# Patient Record
Sex: MALE | Race: WHITE | NOT HISPANIC OR LATINO | URBAN - METROPOLITAN AREA
[De-identification: names, ages, dates, MRNs, and addresses within clinical notes are randomized per-mention and may not be internally consistent; named-entity substitution may affect disease eponyms.]

---

## 2018-05-29 ENCOUNTER — OFFICE VISIT - HEALTHEAST (OUTPATIENT)
Dept: UROLOGY | Facility: CLINIC | Age: 58
End: 2018-05-29

## 2018-05-29 DIAGNOSIS — N23 RENAL COLIC: ICD-10-CM

## 2018-05-29 DIAGNOSIS — N20.9 URINARY TRACT STONES: ICD-10-CM

## 2018-05-29 DIAGNOSIS — N20.1 CALCULUS OF URETER: ICD-10-CM

## 2018-05-29 LAB
ALBUMIN UR-MCNC: NEGATIVE MG/DL
APPEARANCE UR: CLEAR
BILIRUB UR QL STRIP: NEGATIVE
COLOR UR AUTO: YELLOW
GLUCOSE UR STRIP-MCNC: ABNORMAL MG/DL
HGB UR QL STRIP: NEGATIVE
KETONES UR STRIP-MCNC: NEGATIVE MG/DL
LEUKOCYTE ESTERASE UR QL STRIP: NEGATIVE
NITRATE UR QL: NEGATIVE
PH UR STRIP: 5 [PH] (ref 5–8)
SP GR UR STRIP: 1.02 (ref 1–1.03)
UROBILINOGEN UR STRIP-ACNC: ABNORMAL

## 2018-05-29 RX ORDER — CHLORTHALIDONE 25 MG/1
TABLET ORAL
Status: SHIPPED | COMMUNITY
Start: 2017-11-09

## 2018-05-29 RX ORDER — AMLODIPINE BESYLATE 10 MG/1
TABLET ORAL
Status: SHIPPED | COMMUNITY
Start: 2017-11-09

## 2018-05-29 RX ORDER — MINOCYCLINE HYDROCHLORIDE 100 MG/1
CAPSULE ORAL
Status: SHIPPED | COMMUNITY
Start: 2018-04-06

## 2018-05-29 RX ORDER — LISINOPRIL 40 MG/1
TABLET ORAL
Status: SHIPPED | COMMUNITY
Start: 2017-11-09

## 2018-05-29 RX ORDER — SIMVASTATIN 10 MG
TABLET ORAL
Status: SHIPPED | COMMUNITY
Start: 2017-06-12

## 2018-06-11 ENCOUNTER — AMBULATORY - HEALTHEAST (OUTPATIENT)
Dept: LAB | Facility: CLINIC | Age: 58
End: 2018-06-11

## 2018-06-11 ENCOUNTER — AMBULATORY - HEALTHEAST (OUTPATIENT)
Dept: UROLOGY | Facility: CLINIC | Age: 58
End: 2018-06-11

## 2018-06-11 DIAGNOSIS — N20.1 CALCULUS OF URETER: ICD-10-CM

## 2018-06-13 LAB
1ST CONSTITUENT:: NORMAL
SOURCE: NORMAL

## 2018-06-22 ENCOUNTER — OFFICE VISIT - HEALTHEAST (OUTPATIENT)
Dept: UROLOGY | Facility: CLINIC | Age: 58
End: 2018-06-22

## 2018-06-22 DIAGNOSIS — N20.9 URINARY CALCULUS: ICD-10-CM

## 2018-06-22 DIAGNOSIS — N20.0 NEPHROLITHIASIS, URIC ACID: ICD-10-CM

## 2018-06-22 DIAGNOSIS — N20.0 CALCULUS OF KIDNEY: ICD-10-CM

## 2021-06-16 PROBLEM — N20.1 URETERAL STONE: Status: ACTIVE | Noted: 2018-05-28

## 2021-06-18 NOTE — PROGRESS NOTES
Assessment/Plan:        Diagnoses and all orders for this visit:    Urinary tract stones  -     Urinalysis Macroscopic  -     Symptom Control While Passing a Stone Education  -     CT Abdomen Pelvis Without Oral Without IV Contrast; Future; Expected date: 6/12/18  -     ondansetron (ZOFRAN-ODT) 4 MG disintegrating tablet; Take 1 tablet (4 mg total) by mouth every 6 (six) hours as needed for nausea.  Dispense: 10 tablet; Refill: 1  -     oxyCODONE (ROXICODONE) 5 MG immediate release tablet; Take 1-2 tablets (5-10 mg total) by mouth every 4 (four) hours as needed for pain.  Dispense: 24 tablet; Refill: 0  -     Patient Stated Goal: Pass my stone    Renal colic  -     Symptom Control While Passing a Stone Education  -     CT Abdomen Pelvis Without Oral Without IV Contrast; Future; Expected date: 6/12/18  -     ondansetron (ZOFRAN-ODT) 4 MG disintegrating tablet; Take 1 tablet (4 mg total) by mouth every 6 (six) hours as needed for nausea.  Dispense: 10 tablet; Refill: 1  -     oxyCODONE (ROXICODONE) 5 MG immediate release tablet; Take 1-2 tablets (5-10 mg total) by mouth every 4 (four) hours as needed for pain.  Dispense: 24 tablet; Refill: 0  -     Patient Stated Goal: Pass my stone    Calculus of ureter  -     Symptom Control While Passing a Stone Education  -     CT Abdomen Pelvis Without Oral Without IV Contrast; Future; Expected date: 6/12/18  -     ondansetron (ZOFRAN-ODT) 4 MG disintegrating tablet; Take 1 tablet (4 mg total) by mouth every 6 (six) hours as needed for nausea.  Dispense: 10 tablet; Refill: 1  -     oxyCODONE (ROXICODONE) 5 MG immediate release tablet; Take 1-2 tablets (5-10 mg total) by mouth every 4 (four) hours as needed for pain.  Dispense: 24 tablet; Refill: 0  -     Patient Stated Goal: Pass my stone    Other orders  -     amLODIPine (NORVASC) 10 MG tablet; TAKE ONE TABLET BY MOUTH DAILY.  -     aspirin-calcium carbonate 81 mg-300 mg calcium(777 mg) Tab; Take 81 mg by mouth.  -      chlorthalidone (HYGROTEN) 25 MG tablet; TAKE 1 TABLET BY MOUTH DAILY  -     lisinopril (PRINIVIL,ZESTRIL) 40 MG tablet; TAKE 1 TABLET BY MOUTH DAILY  -     minocycline (MINOCIN,DYNACIN) 100 MG capsule; TAKE 1 CAPSULE BY MOUTH TWICE DAILY  -     multivitamin (ONE A DAY) per tablet; Take by mouth.  -     simvastatin (ZOCOR) 10 MG tablet; TAKE 1 TABLET BY MOUTH DAILY      Stone Management Plan  [facility] 5/29/2018   Urinary Tract Infection No suspicion of infection   Renal Colic Well controlled symptoms   Renal Failure No suspicion of renal failure   Current CT date 5/28/2018   Right sided stones? No   R Stone Event No current event   Left sided stones? Yes   L Number of ureteral stones 1   L GSD of ureteral stones 3   L Location of ureteral stone Distal   L Number of kidney stones  5   L GSD of kidney stones 2 - 4   L Hydronephrosis Mild   L Stone Event New event   Diagnosis date 5/28/2018   Initial location of primary symptomatic stone Distal   Initial GSD of primary symptomatic stone 3   L MET Status Initiation   L Current Plan MET   MET 2 week F/U             Subjective:      HPI  Mr. Nilesh Morrow is a 58 y.o.  male presenting to the [facility] [place] with history of onset of left flank pain on 5/27/2018.  Pain was controlled.  In the course of this his urine showed specific gravity 1.15 pH 6 moderate blood 3 5 RBCs 0-5 WBCs potassium 3.5 sodium 136 calcium 10.5 creatinine 1.61 white blood count 9100 hemoglobin 16.5.    Personal review of CT scan obtained 5/27/2018 demonstrated a 2.5 mm left distal ureteral stone 2 cm from the UVJ.  There were no stones on the right.  In the left kidney there is a cystic mass in the upper pole with milk of calcium-like substance present.  This lesion was poorly defined.  In the lower pole medially there was a cystic lesion containing an aggregate of stones on axial 3 mm x 19 mm and on coronal it flattens out measures about 17 x 9 mm irregular  in character with stones probably the largest being 3 mm.  Both these cystic lesions could be a calyceal diverticulum t.  THere were 2 other punctate stones in the left kidney lower pole outside of the cystic lesions.  1.5 cm cyst noted right lower pole lateral aspect.    Patient comes at this time asymptomatic without abdominal or flank pain or voiding symptoms of urgency or frequency.    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  Alternative would be a retrograde .    Patient has available to him for pain relief acetaminophen Dramamine ibuprofen a small amount of Percocet.  We did give him 24 tablets of oxycodone to take as long as he is using the 1000 mg acetaminophen every 6 hour regimen.       ROS   Review of Systems  A 12 point comprehensive review of systems is negative except for HPI    Past Medical History:   Diagnosis Date     Diabetes mellitus      Hypertension      Kidney stone        Past Surgical History:   Procedure Laterality Date     KNEE SURGERY Bilateral      NM REMOVAL OF SPERM DUCT(S)      Description: Surgery Of Male Genitalia Vasectomy;  Recorded: 10/10/2008;       Current Outpatient Prescriptions   Medication Sig Dispense Refill     acetaminophen (TYLENOL) 500 MG tablet Take 2 tablets (1,000 mg total) by mouth 4 (four) times a day for 7 days. 56 tablet 0     amLODIPine (NORVASC) 10 MG tablet TAKE ONE TABLET BY MOUTH DAILY.       aspirin-calcium carbonate 81 mg-300 mg calcium(777 mg) Tab Take 81 mg by mouth.       chlorthalidone (HYGROTEN) 25 MG tablet TAKE 1 TABLET BY MOUTH DAILY       dimenhyDRINATE (DRAMAMINE) 50 MG tablet Take 1 tablet (50 mg total) by  mouth 4 (four) times a day as needed. 28 tablet 0     ibuprofen (ADVIL,MOTRIN) 200 MG tablet Take 2 tablets (400 mg total) by mouth 4 (four) times a day for 7 days. 56 tablet 0     lisinopril (PRINIVIL,ZESTRIL) 40 MG tablet TAKE 1 TABLET BY MOUTH DAILY       metFORMIN (GLUCOPHAGE) 500 MG tablet Take 1 tablet (500 mg total) by mouth 2 (two) times a day with meals. 60 tablet 0     minocycline (MINOCIN,DYNACIN) 100 MG capsule TAKE 1 CAPSULE BY MOUTH TWICE DAILY       multivitamin (ONE A DAY) per tablet Take by mouth.       oxyCODONE-acetaminophen (PERCOCET) 5-325 mg per tablet Take 1 tablet by mouth every 4 (four) hours as needed for pain. 15 tablet 0     simvastatin (ZOCOR) 10 MG tablet TAKE 1 TABLET BY MOUTH DAILY       ondansetron (ZOFRAN-ODT) 4 MG disintegrating tablet Take 1 tablet (4 mg total) by mouth every 6 (six) hours as needed for nausea. 10 tablet 1     oxyCODONE (ROXICODONE) 5 MG immediate release tablet Take 1-2 tablets (5-10 mg total) by mouth every 4 (four) hours as needed for pain. 24 tablet 0     No current facility-administered medications for this visit.        Allergies   Allergen Reactions     Penicillins        Social History     Social History     Marital status:      Spouse name: N/A     Number of children: N/A     Years of education: N/A     Occupational History      of medical devices      Social History Main Topics     Smoking status: Never Smoker     Smokeless tobacco: Never Used     Alcohol use Yes      Comment: occas     Drug use: Not on file     Sexual activity: Not on file     Other Topics Concern     Not on file     Social History Narrative       Family History   Problem Relation Age of Onset     Diabetes Mother      Diabetes Father      Gout Father      Heart disease Father      mi and a-fib     Urolithiasis Brother      Clotting disorder Neg Hx      Cancer Neg Hx        Objective:      Physical Exam  Vitals:    05/29/18 1109   BP: 114/73   Pulse: 74   Temp: 98.6  F (37   C)     General - well developed, well nourished, appropriate for age. Appears no distress at this time   Heart - regular rate and rhythm, no murmur  Respiratory - normal effort, clear to auscultation, good air entry without adventitious noises  Abdomen - mildly obese soft, non-tender, no hepatosplenomegaly, no masses.   - no flank tenderness, no suprapubic tenderness, kidney and bladder non-palpable  MSK - normal spinal curvature. no spinal tenderness. normal gait. muscular strength intact.  Neurology - cranial nerves II-XII grossly intact, normal sensation, no unsteadiness  Skin - intact, no bruising, no gouty tophi  Psych - oriented to time, place, and person, normal mood and affect.      Labs  Urinalysis POC (Office):  Nitrite, UA   Date Value Ref Range Status   05/28/2018 Negative Negative Final   05/27/2018 Negative Negative Final   04/06/2010 Negative (Negative) Final       Lab Urinalysis:  Blood, UA   Date Value Ref Range Status   05/28/2018 Moderate (!) Negative Final   05/27/2018 Negative Negative Final   04/06/2010 Negative (Negative) Final     Nitrite, UA   Date Value Ref Range Status   05/28/2018 Negative Negative Final   05/27/2018 Negative Negative Final   04/06/2010 Negative (Negative) Final     Leukocytes, UA   Date Value Ref Range Status   05/28/2018 Negative Negative Final   05/27/2018 Negative Negative Final   04/06/2010 Negative (Negative) Final     pH, UA   Date Value Ref Range Status   05/28/2018 6.0 4.5 - 8.0 Final   05/27/2018 6.0 4.5 - 8.0 Final   04/06/2010 6.0 (5.0-8.0) Final

## 2021-06-18 NOTE — PROGRESS NOTES
Assessment/Plan:        Diagnoses and all orders for this visit:    Calculus of kidney  -     Urinalysis Macroscopic  -     Renal Function Profile; Future; Expected date: 6/22/18  -     Magnesium; Future; Expected date: 6/22/18  -     Uric Acid; Future; Expected date: 6/22/18  -     Calcium, Ionized, Measured; Future; Expected date: 6/22/18  -     Parathyroid Hormone Intact with Minerals; Future; Expected date: 6/22/18  -     Stone Formation, 24 Hour Urine x2; Standing  -     Patient Stated Goal: Prevent further stones  -     24 Hour Urine Collection Steps Education    Nephrolithiasis, uric acid  -     Renal Function Profile; Future; Expected date: 6/22/18  -     Magnesium; Future; Expected date: 6/22/18  -     Uric Acid; Future; Expected date: 6/22/18  -     Calcium, Ionized, Measured; Future; Expected date: 6/22/18  -     Parathyroid Hormone Intact with Minerals; Future; Expected date: 6/22/18  -     Stone Formation, 24 Hour Urine x2; Standing  -     Patient Stated Goal: Prevent further stones  -     24 Hour Urine Collection Steps Education    Urinary calculus  -     Renal Function Profile; Future; Expected date: 6/22/18  -     Magnesium; Future; Expected date: 6/22/18  -     Uric Acid; Future; Expected date: 6/22/18  -     Calcium, Ionized, Measured; Future; Expected date: 6/22/18  -     Parathyroid Hormone Intact with Minerals; Future; Expected date: 6/22/18  -     Stone Formation, 24 Hour Urine x2; Standing  -     Patient Stated Goal: Prevent further stones  -     24 Hour Urine Collection Steps Education      Stone Management Plan  [facility] 5/29/2018 6/22/2018   Urinary Tract Infection No suspicion of infection No suspicion of infection   Renal Colic Well controlled symptoms Asymptomatic at this time   Renal Failure No suspicion of renal failure No suspicion of renal failure   Current CT date 5/28/2018 -   Right sided stones? No -   R Stone Event No current event No current event   Left sided  stones? Yes -   L Number of ureteral stones 1 -   L GSD of ureteral stones 3 -   L Location of ureteral stone Distal -   L Number of kidney stones  5 -   L GSD of kidney stones 2 - 4 -   L Hydronephrosis Mild -   L Stone Event New event Established event   Diagnosis date 5/28/2018 -   Initial location of primary symptomatic stone Distal -   Initial GSD of primary symptomatic stone 3 -   L MET Status Initiation Passed   L Current Plan MET -   MET 2 week F/U -             Subjective:      HPI  Mr. Nilesh Morrow is a 58 y.o.  male returning to the [facility] [place] for follow-up left distal ureteral stone with 2 cystic areas in the left kidney containing stone material lower pole suggestive layering out in the dependent portion of the cyst.  There is an additional small dependent 2 mm stone on the left.  There are no stones on the right.    In the interval since seen patient passed his stone in the left ureter.  Stone analysis was 100% uric acid.  Patient has no history of gout and there is no family history of gout.  Patient does have another relative who has had stones.    Patient's urine pH and review of the chart was 6.0 on 2 occasions 4/6/2010 and 5/27/2018.  It was 5.0 on 5/29/2018 and it is 5.0 as of today.    UA today specific gravity 1.020 pH 5 negative blood nitrate and leukocytes.    Impression recent spontaneous passage of 100% uric acid stone from left, known cystic structures numbering to left kidney with debris consistent with small uric acid stones along with a single small stone in the left kidney no stones on the right    Plan we will proceed with stone risk studies including 24 hour urines and serum studies including parathyroid studies and serum uric acid.  At this point would plan on alkalinization of urine to achieve pH of 7.  Patient given information where he can get urine pH strips for checking pH on his own and calling in results when we get to that point.  Would  consider use of potassium citrate products or sodium bicarb or a combination of both.    His creatinine was 1.01 2010 and on 5/27/2018 it was 1.64.     ROS   Review of systems is negative except for HPI.    Past Medical History:   Diagnosis Date     Diabetes mellitus (H)      Hypertension      Kidney stone        Past Surgical History:   Procedure Laterality Date     KNEE SURGERY Bilateral      CT REMOVAL OF SPERM DUCT(S)      Description: Surgery Of Male Genitalia Vasectomy;  Recorded: 10/10/2008;       Current Outpatient Prescriptions   Medication Sig Dispense Refill     amLODIPine (NORVASC) 10 MG tablet TAKE ONE TABLET BY MOUTH DAILY.       aspirin-calcium carbonate 81 mg-300 mg calcium(777 mg) Tab Take 81 mg by mouth.       chlorthalidone (HYGROTEN) 25 MG tablet TAKE 1 TABLET BY MOUTH DAILY       lisinopril (PRINIVIL,ZESTRIL) 40 MG tablet TAKE 1 TABLET BY MOUTH DAILY       metFORMIN (GLUCOPHAGE) 500 MG tablet Take 1 tablet (500 mg total) by mouth 2 (two) times a day with meals. 60 tablet 0     minocycline (MINOCIN,DYNACIN) 100 MG capsule TAKE 1 CAPSULE BY MOUTH TWICE DAILY       multivitamin (ONE A DAY) per tablet Take by mouth.       simvastatin (ZOCOR) 10 MG tablet TAKE 1 TABLET BY MOUTH DAILY       No current facility-administered medications for this visit.        Allergies   Allergen Reactions     Penicillins        Social History     Social History     Marital status:      Spouse name: N/A     Number of children: N/A     Years of education: N/A     Occupational History      of medical devices      Social History Main Topics     Smoking status: Never Smoker     Smokeless tobacco: Never Used     Alcohol use Yes      Comment: occas     Drug use: Not on file     Sexual activity: Not on file     Other Topics Concern     Not on file     Social History Narrative       Family History   Problem Relation Age of Onset     Diabetes Mother      Diabetes Father      Gout Father      Heart disease Father       mi and a-fib     Urolithiasis Brother      Clotting disorder Neg Hx      Cancer Neg Hx      Objective:      Physical Exam  Vitals:    06/22/18 1513   BP: 125/79   Pulse: 83   Temp: 98  F (36.7  C)     General - well developed, well nourished, appropriate for age. Appears no distress at this time  Abdomen - mildly obese soft, non-tender, no hepatosplenomegaly, no masses.   - no flank tenderness, no suprapubic tenderness, kidney and bladder non-palpable  MSK - normal spinal curvature. no spinal tenderness. normal gait. muscular strength intact.  Psych - oriented to time, place, and person, normal mood and affect.      Labs   Urinalysis POC (Office):  Nitrite, UA   Date Value Ref Range Status   06/22/2018 Negative Negative Final   05/29/2018 Negative Negative Final   05/28/2018 Negative Negative Final       Lab Urinalysis:  Blood, UA   Date Value Ref Range Status   06/22/2018 Negative Negative Final   05/29/2018 Negative Negative Final   05/28/2018 Moderate (!) Negative Final     Nitrite, UA   Date Value Ref Range Status   06/22/2018 Negative Negative Final   05/29/2018 Negative Negative Final   05/28/2018 Negative Negative Final     Leukocytes, UA   Date Value Ref Range Status   06/22/2018 Negative Negative Final   05/29/2018 Negative Negative Final   05/28/2018 Negative Negative Final     pH, UA   Date Value Ref Range Status   06/22/2018 5.0 5.0 - 8.0 Final   05/29/2018 5.0 5.0 - 8.0 Final   05/28/2018 6.0 4.5 - 8.0 Final